# Patient Record
Sex: MALE | URBAN - METROPOLITAN AREA
[De-identification: names, ages, dates, MRNs, and addresses within clinical notes are randomized per-mention and may not be internally consistent; named-entity substitution may affect disease eponyms.]

---

## 2023-04-27 ENCOUNTER — EMERGENCY (EMERGENCY)
Facility: HOSPITAL | Age: 68
LOS: 1 days | Discharge: HOME | End: 2023-04-27
Attending: EMERGENCY MEDICINE
Payer: COMMERCIAL

## 2023-04-27 VITALS
RESPIRATION RATE: 18 BRPM | OXYGEN SATURATION: 100 % | WEIGHT: 175.05 LBS | HEIGHT: 70 IN | DIASTOLIC BLOOD PRESSURE: 94 MMHG | TEMPERATURE: 99 F | SYSTOLIC BLOOD PRESSURE: 193 MMHG | HEART RATE: 86 BPM

## 2023-04-27 PROCEDURE — 73600 X-RAY EXAM OF ANKLE: CPT

## 2023-04-27 PROCEDURE — 99284 EMERGENCY DEPT VISIT MOD MDM: CPT

## 2023-04-27 PROCEDURE — 72125 CT NECK SPINE W/O DYE: CPT | Mod: MA

## 2023-04-27 PROCEDURE — 70450 CT HEAD/BRAIN W/O DYE: CPT | Mod: 26,MA

## 2023-04-27 PROCEDURE — 71250 CT THORAX DX C-: CPT | Mod: MA

## 2023-04-27 PROCEDURE — 99284 EMERGENCY DEPT VISIT MOD MDM: CPT | Mod: 25

## 2023-04-27 PROCEDURE — 71250 CT THORAX DX C-: CPT | Mod: 26,MA

## 2023-04-27 PROCEDURE — 73600 X-RAY EXAM OF ANKLE: CPT | Mod: 26,LT

## 2023-04-27 PROCEDURE — 72125 CT NECK SPINE W/O DYE: CPT | Mod: 26,MA

## 2023-04-27 PROCEDURE — 70450 CT HEAD/BRAIN W/O DYE: CPT | Mod: MA

## 2023-04-27 RX ORDER — CYCLOBENZAPRINE HYDROCHLORIDE 10 MG/1
1 TABLET, FILM COATED ORAL
Qty: 15 | Refills: 0
Start: 2023-04-27 | End: 2023-05-01

## 2023-04-27 RX ORDER — CYCLOBENZAPRINE HYDROCHLORIDE 10 MG/1
10 TABLET, FILM COATED ORAL ONCE
Refills: 0 | Status: COMPLETED | OUTPATIENT
Start: 2023-04-27 | End: 2023-04-27

## 2023-04-27 RX ORDER — IBUPROFEN 200 MG
1 TABLET ORAL
Qty: 45 | Refills: 0
Start: 2023-04-27 | End: 2023-05-11

## 2023-04-27 RX ORDER — IBUPROFEN 200 MG
600 TABLET ORAL ONCE
Refills: 0 | Status: COMPLETED | OUTPATIENT
Start: 2023-04-27 | End: 2023-04-27

## 2023-04-27 RX ADMIN — CYCLOBENZAPRINE HYDROCHLORIDE 10 MILLIGRAM(S): 10 TABLET, FILM COATED ORAL at 13:55

## 2023-04-27 RX ADMIN — Medication 600 MILLIGRAM(S): at 13:55

## 2023-04-27 NOTE — ED PROVIDER NOTE - PATIENT PORTAL LINK FT
You can access the FollowMyHealth Patient Portal offered by [facility] by registering at the following website: [email or web address]. By joining [facility]’s FollowMyHealth portal, you will also be able to view your health information using other applications (apps) compatible with our system.
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
## 2023-04-27 NOTE — ED PROVIDER NOTE - PHYSICAL EXAMINATION
VITAL SIGNS: I have reviewed vital signs  CONSTITUTIONAL:  in no acute distress.  SKIN: Warm, dry, no rash.  HEAD: Normocephalic, atraumatic. +C-COLLAR.   EYES: PERRL, conjunctiva and sclera clear.  ENT: pink & moist mucosa, no pharyngeal erythema  NECK: Supple, non tender. No cervical lymphadenopathy.  CARD: Regular rate and rhythm. No murmurs.   RESP: No wheezes, rales or rhonchi.   ABD:  soft, non-distended, non-tender.   MSK:  LEFT ANKLE w/ NO BONY DEFORMITY, TTP. FULL ROM.    NEURO: Alert, oriented. Grossly unremarkable. No focal deficits.   PSYCH: Cooperative, alert & oriented x3
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
## 2023-04-27 NOTE — ED PROVIDER NOTE - NS ED ROS FT
Review of Systems  CONSTITUTIONAL: afebrile w/no diaphoresis or weight changes  SKIN: warm, dry w/ no rash or bleeding  EYES: no changes to vision  ENT: no changes in hearing, no sore throat  RESPIRATORY: no cough or SOB  CARDIAC: no chest pain & no palpitations  GI: no abd pain, nausea, vomiting, diarrhea, constipation, or blood in stool/rosibel blood  GENITO-URINARY: no discharge, dysuria or hematuria,   MUSCULOSKELETAL:  +LEF ANKLE PAIN  NEUROLOGIC: no weakness, anesthesia or paresthesias +HEADACHE  PSYCH: no anxiety, non suicidal, non homicidal, without hallucinations or depression